# Patient Record
Sex: MALE | Race: WHITE | NOT HISPANIC OR LATINO | Employment: FULL TIME | URBAN - NONMETROPOLITAN AREA
[De-identification: names, ages, dates, MRNs, and addresses within clinical notes are randomized per-mention and may not be internally consistent; named-entity substitution may affect disease eponyms.]

---
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
## 2023-04-02 ENCOUNTER — OFFICE VISIT (OUTPATIENT)
Age: 23
End: 2023-04-02

## 2023-04-02 VITALS
HEART RATE: 98 BPM | DIASTOLIC BLOOD PRESSURE: 80 MMHG | OXYGEN SATURATION: 100 % | TEMPERATURE: 98.1 F | RESPIRATION RATE: 16 BRPM | SYSTOLIC BLOOD PRESSURE: 128 MMHG | BODY MASS INDEX: 23.49 KG/M2 | WEIGHT: 155 LBS | HEIGHT: 68 IN

## 2023-04-02 DIAGNOSIS — L02.11 ABSCESS OF NECK: Primary | ICD-10-CM

## 2023-04-02 RX ORDER — ELEXACAFTOR, TEZACAFTOR, AND IVACAFTOR 100-50-75
KIT ORAL
COMMUNITY
Start: 2023-01-03

## 2023-04-02 RX ORDER — CLINDAMYCIN HYDROCHLORIDE 300 MG/1
300 CAPSULE ORAL 3 TIMES DAILY
Qty: 21 CAPSULE | Refills: 0 | Status: SHIPPED | OUTPATIENT
Start: 2023-04-02 | End: 2023-04-09

## 2023-04-02 NOTE — PATIENT INSTRUCTIONS
Abscess   WHAT YOU NEED TO KNOW:   A warm compress may help your abscess drain  Your healthcare provider may make a cut in the abscess so it can drain  You may need surgery to remove an abscess that is on your hands or buttocks  DISCHARGE INSTRUCTIONS:   Return to the emergency department if:   The area around your abscess becomes very painful, warm, or has red streaks  You have a fever and chills  Your heart is beating faster than usual     You feel faint or confused  Call your doctor if:   Your abscess gets bigger or does not get better  Your abscess returns  You have questions or concerns about your condition or care  Medicines: You may  need any of the following:  Antibiotics  help treat a bacterial infection  Acetaminophen  decreases pain and fever  It is available without a doctor's order  Ask how much to take and how often to take it  Follow directions  Read the labels of all other medicines you are using to see if they also contain acetaminophen, or ask your doctor or pharmacist  Acetaminophen can cause liver damage if not taken correctly  NSAIDs , such as ibuprofen, help decrease swelling, pain, and fever  This medicine is available with or without a doctor's order  NSAIDs can cause stomach bleeding or kidney problems in certain people  If you take blood thinner medicine, always ask your healthcare provider if NSAIDs are safe for you  Always read the medicine label and follow directions  Take your medicine as directed  Contact your healthcare provider if you think your medicine is not helping or if you have side effects  Tell your provider if you are allergic to any medicine  Keep a list of the medicines, vitamins, and herbs you take  Include the amounts, and when and why you take them  Bring the list or the pill bottles to follow-up visits  Carry your medicine list with you in case of an emergency  Self-care:   Apply a warm compress to your abscess    This will help it open and drain  Wet a washcloth in warm, but not hot, water  Apply the compress for 10 minutes  Repeat this 4 times each day  Do not  press on an abscess or try to open it with a needle  You may push the bacteria deeper or into your blood  Do not share your clothes, towels, or sheets with anyone  This can spread the infection to others  Wash your hands often  This can help prevent the spread of germs  Use soap and water or an alcohol-based hand rub  Care for your wound after it is drained:   Care for your wound as directed  If your healthcare provider says it is okay, carefully remove the bandage and gauze packing  You may need to soak the gauze to get it out of your wound  Clean your wound and the area around it as directed  Dry the area and put on new, clean bandages  Change your bandages when they get wet or dirty  Ask your healthcare provider how to change the gauze in your wound  Keep track of how many pieces of gauze are placed inside the wound  Do not put too much packing in the wound  Do not pack the gauze too tightly in your wound  Follow up with your healthcare provider in 1 to 3 days: You may need to have your packing removed or your bandage changed  Write down your questions so you remember to ask them during your visits  © Copyright [employer] Heather 2022 Information is for End User's use only and may not be sold, redistributed or otherwise used for commercial purposes  The above information is an  only  It is not intended as medical advice for individual conditions or treatments  Talk to your doctor, nurse or pharmacist before following any medical regimen to see if it is safe and effective for you

## 2023-04-02 NOTE — PROGRESS NOTES
[facility]        NAME: Nunu Rivera is a 25 y o  male  : 2000    MRN: [id number]  DATE: 2023  TIME: 4:07 PM    Assessment and Plan   Abscess of neck [L02 11]  1  Abscess of neck  clindamycin (CLEOCIN) 300 MG capsule    Ambulatory Referral to Otolaryngology        Will place patient on the mycin 300 mg 3 times a day  Patient advised to apply warm compresses for circulation to affected area but may apply cold compresses to reduce inflammation  Will refer patient to ENT  Patient advised to go immediately to the ER if he develops difficulty swallowing, fever, chills or worsening pain and swelling  Patient Instructions     Patient Instructions     Abscess   WHAT YOU NEED TO KNOW:   A warm compress may help your abscess drain  Your healthcare provider may make a cut in the abscess so it can drain  You may need surgery to remove an abscess that is on your hands or buttocks  DISCHARGE INSTRUCTIONS:   Return to the emergency department if:   • The area around your abscess becomes very painful, warm, or has red streaks  • You have a fever and chills  • Your heart is beating faster than usual     • You feel faint or confused  Call your doctor if:   • Your abscess gets bigger or does not get better  • Your abscess returns  • You have questions or concerns about your condition or care  Medicines: You may  need any of the following:  • Antibiotics  help treat a bacterial infection  • Acetaminophen  decreases pain and fever  It is available without a doctor's order  Ask how much to take and how often to take it  Follow directions  Read the labels of all other medicines you are using to see if they also contain acetaminophen, or ask your doctor or pharmacist  Acetaminophen can cause liver damage if not taken correctly  • NSAIDs , such as ibuprofen, help decrease swelling, pain, and fever  This medicine is available with or without a doctor's order   NSAIDs can cause stomach bleeding or kidney problems in certain people  If you take blood thinner medicine, always ask your healthcare provider if NSAIDs are safe for you  Always read the medicine label and follow directions  • Take your medicine as directed  Contact your healthcare provider if you think your medicine is not helping or if you have side effects  Tell your provider if you are allergic to any medicine  Keep a list of the medicines, vitamins, and herbs you take  Include the amounts, and when and why you take them  Bring the list or the pill bottles to follow-up visits  Carry your medicine list with you in case of an emergency  Self-care:   • Apply a warm compress to your abscess  This will help it open and drain  Wet a washcloth in warm, but not hot, water  Apply the compress for 10 minutes  Repeat this 4 times each day  Do not  press on an abscess or try to open it with a needle  You may push the bacteria deeper or into your blood  • Do not share your clothes, towels, or sheets with anyone  This can spread the infection to others  • Wash your hands often  This can help prevent the spread of germs  Use soap and water or an alcohol-based hand rub  Care for your wound after it is drained:   • Care for your wound as directed  If your healthcare provider says it is okay, carefully remove the bandage and gauze packing  You may need to soak the gauze to get it out of your wound  Clean your wound and the area around it as directed  Dry the area and put on new, clean bandages  Change your bandages when they get wet or dirty  • Ask your healthcare provider how to change the gauze in your wound  Keep track of how many pieces of gauze are placed inside the wound  Do not put too much packing in the wound  Do not pack the gauze too tightly in your wound  Follow up with your healthcare provider in 1 to 3 days: You may need to have your packing removed or your bandage changed   Write down your questions so you remember to ask them during your visits  © Copyright Azam Wayne 2022 Information is for End User's use only and may not be sold, redistributed or otherwise used for commercial purposes  The above information is an  only  It is not intended as medical advice for individual conditions or treatments  Talk to your doctor, nurse or pharmacist before following any medical regimen to see if it is safe and effective for you  Follow up with PCP in 3-5 days  Proceed to  ER if symptoms worsen  Chief Complaint     Chief Complaint   Patient presents with   • Neck Pain     Starting Friday night patient began to experience pain in L side of neck  Obvious swelling noted  Denies fevers but had a cold three weeks ago  History of Present Illness       Patient complains of gradual onset pain and swelling of left neck 2 days ago  He denies trauma but had upper respiratory infection 3 weeks ago that he believes resolved  He was on 7-day course of Cipro for that infection  Patient had a dental infection related to a cracked wisdom tooth 3 months ago but all four wisdom teeth were subsequently removed without complications  Patient admits to mild sore throat 3 to 4 days ago which resolved without treatment  He denies difficulty swallowing  He denies fever or chills  Denies similar symptoms in the past   Patient initially presumed his neck pain was due to to a musculoskeletal injury so he tried stretching his neck without symptomatic relief  Patient has applied ice to his neck and notes some symptomatic improvement  Review of Systems   Review of Systems   Constitutional: Negative for chills and fever  Musculoskeletal: Positive for neck pain  Negative for arthralgias, gait problem, joint swelling and myalgias  Skin: Negative for color change, rash and wound  Neurological: Negative for numbness           Current Medications       Current Outpatient Medications:   •  clindamycin "(CLEOCIN) 300 MG capsule, Take 1 capsule (300 mg total) by mouth 3 (three) times a day for 7 days, Disp: 21 capsule, Rfl: 0  •  Fiwtgqga-Hoofzip-Pmgqvi&Ivacaf (Trikafta) 100-50-75 & 150 MG TBPK,  Start: 01/03/23 14:53:00 EST, See Instructions, Disp# 84 tab, Refills: 11, Take 2 tablets (orange) each morning, and 1 tablet (light blue) each evening,  approximately 12 hours apart, Pharmacy: [facility] [place] [id number], Disp: , Rfl:   •  famotidine (PEPCID) 20 mg tablet, Take 20 mg by mouth 2 (two) times a day, Disp: , Rfl:   •  Pancrelipase, Lip-Prot-Amyl, 7155-1359 units CPEP, Take 1 capsule by mouth, Disp: , Rfl:     Current Allergies     Allergies as of 04/02/2023   • (No Known Allergies)            The following portions of the patient's history were reviewed and updated as appropriate: allergies, current medications, past family history, past medical history, past social history, past surgical history and problem list      Past Medical History:   Diagnosis Date   • [facility]        History reviewed  No pertinent surgical history  History reviewed  No pertinent family history  Medications have been verified  Objective   /80 (BP Location: Left arm, Patient Position: Sitting, Cuff Size: Standard)   Pulse 98   Temp 98 1 °F (36 7 °C) (Tympanic)   Resp 16   Ht 5' 8\" (1 727 m)   Wt 70 3 kg (155 lb)   SpO2 100%   BMI 23 57 kg/m²        Physical Exam     Physical Exam  Vitals and nursing note reviewed  Constitutional:       General: He is not in acute distress  Appearance: He is well-developed  He is not ill-appearing, toxic-appearing or diaphoretic  HENT:      Head: Normocephalic and atraumatic  Right Ear: Tympanic membrane normal       Left Ear: Tympanic membrane normal       Nose: Mucosal edema present  No rhinorrhea        Mouth/Throat:      Mouth: Mucous membranes are moist       Pharynx: No oropharyngeal exudate or posterior oropharyngeal " erythema  Eyes:      Conjunctiva/sclera: Conjunctivae normal       Pupils: Pupils are equal, round, and reactive to light  Neck:      Comments: Tender swelling upper aspect anterior neck presumed in upper anterior cervical lymph node  Cardiovascular:      Rate and Rhythm: Normal rate and regular rhythm  Pulmonary:      Effort: Pulmonary effort is normal  No respiratory distress  Breath sounds: Normal breath sounds  Musculoskeletal:      Cervical back: Tenderness present  No rigidity  Lymphadenopathy:      Cervical: No cervical adenopathy  Skin:     General: Skin is warm and dry  Coloration: Skin is not pale  Neurological:      Mental Status: He is alert and oriented to person, place, and time  Psychiatric:         Mood and Affect: Mood normal          Behavior: Behavior normal          Thought Content:  Thought content normal          Judgment: Judgment normal